# Patient Record
Sex: FEMALE
[De-identification: names, ages, dates, MRNs, and addresses within clinical notes are randomized per-mention and may not be internally consistent; named-entity substitution may affect disease eponyms.]

---

## 2018-01-17 ENCOUNTER — HOSPITAL ENCOUNTER (EMERGENCY)
Dept: HOSPITAL 14 - H.ER | Age: 58
Discharge: HOME | End: 2018-01-17
Payer: COMMERCIAL

## 2018-01-17 VITALS — OXYGEN SATURATION: 97 % | TEMPERATURE: 98 F

## 2018-01-17 VITALS — DIASTOLIC BLOOD PRESSURE: 80 MMHG | HEART RATE: 91 BPM | RESPIRATION RATE: 16 BRPM | SYSTOLIC BLOOD PRESSURE: 122 MMHG

## 2018-01-17 DIAGNOSIS — E11.9: ICD-10-CM

## 2018-01-17 DIAGNOSIS — J11.1: Primary | ICD-10-CM

## 2018-01-17 DIAGNOSIS — J45.909: ICD-10-CM

## 2018-01-17 DIAGNOSIS — Z79.84: ICD-10-CM

## 2018-01-17 LAB
ALBUMIN SERPL-MCNC: 4.7 G/DL (ref 3.5–5)
ALBUMIN/GLOB SERPL: 1.2 {RATIO} (ref 1–2.1)
ALT SERPL-CCNC: 39 U/L (ref 9–52)
AST SERPL-CCNC: 29 U/L (ref 14–36)
BASOPHILS # BLD AUTO: 0.1 K/UL (ref 0–0.2)
BASOPHILS NFR BLD: 0.5 % (ref 0–2)
BILIRUB UR-MCNC: NEGATIVE MG/DL
BUN SERPL-MCNC: 16 MG/DL (ref 7–17)
CALCIUM SERPL-MCNC: 9.8 MG/DL (ref 8.4–10.2)
COLOR UR: YELLOW
EOSINOPHIL # BLD AUTO: 0.1 K/UL (ref 0–0.7)
EOSINOPHIL NFR BLD: 1.2 % (ref 0–4)
ERYTHROCYTE [DISTWIDTH] IN BLOOD BY AUTOMATED COUNT: 13.6 % (ref 11.5–14.5)
GFR NON-AFRICAN AMERICAN: > 60
GLUCOSE UR STRIP-MCNC: (no result) MG/DL
HGB BLD-MCNC: 13.4 G/DL (ref 12–16)
LEUKOCYTE ESTERASE UR-ACNC: (no result) LEU/UL
LYMPHOCYTES # BLD AUTO: 3.5 K/UL (ref 1–4.3)
LYMPHOCYTES NFR BLD AUTO: 28.5 % (ref 20–40)
MCH RBC QN AUTO: 30.7 PG (ref 27–31)
MCHC RBC AUTO-ENTMCNC: 34 G/DL (ref 33–37)
MCV RBC AUTO: 90.4 FL (ref 81–99)
MONOCYTES # BLD: 1.3 K/UL (ref 0–0.8)
MONOCYTES NFR BLD: 10.8 % (ref 0–10)
NEUTROPHILS # BLD: 7.3 K/UL (ref 1.8–7)
NEUTROPHILS NFR BLD AUTO: 59 % (ref 50–75)
NRBC BLD AUTO-RTO: 0.1 % (ref 0–0)
PH UR STRIP: 5 [PH] (ref 5–8)
PLATELET # BLD: 306 K/UL (ref 130–400)
PMV BLD AUTO: 8.7 FL (ref 7.2–11.7)
PROT UR STRIP-MCNC: 30 MG/DL
RBC # BLD AUTO: 4.36 MIL/UL (ref 3.8–5.2)
RBC # UR STRIP: NEGATIVE /UL
SP GR UR STRIP: 1.02 (ref 1–1.03)
SQUAMOUS EPITHIAL: 1 /HPF (ref 0–5)
URINE CLARITY: (no result)
URINE HYALINE CAST: (no result) /HPF (ref 0–2)
URINE NITRATE: NEGATIVE
UROBILINOGEN UR-MCNC: (no result) MG/DL (ref 0.2–1)
WBC # BLD AUTO: 12.3 K/UL (ref 4.8–10.8)

## 2018-01-17 PROCEDURE — 96374 THER/PROPH/DIAG INJ IV PUSH: CPT

## 2018-01-17 PROCEDURE — 94640 AIRWAY INHALATION TREATMENT: CPT

## 2018-01-17 PROCEDURE — 85025 COMPLETE CBC W/AUTO DIFF WBC: CPT

## 2018-01-17 PROCEDURE — 81003 URINALYSIS AUTO W/O SCOPE: CPT

## 2018-01-17 PROCEDURE — 87804 INFLUENZA ASSAY W/OPTIC: CPT

## 2018-01-17 PROCEDURE — 99285 EMERGENCY DEPT VISIT HI MDM: CPT

## 2018-01-17 PROCEDURE — 71046 X-RAY EXAM CHEST 2 VIEWS: CPT

## 2018-01-17 PROCEDURE — 93005 ELECTROCARDIOGRAM TRACING: CPT

## 2018-01-17 PROCEDURE — 80053 COMPREHEN METABOLIC PANEL: CPT

## 2018-01-17 PROCEDURE — 96361 HYDRATE IV INFUSION ADD-ON: CPT

## 2018-01-17 PROCEDURE — 82550 ASSAY OF CK (CPK): CPT

## 2018-01-17 NOTE — RAD
HISTORY:

cough, hx HIV  



COMPARISON:

2/29/2016



TECHNIQUE:

Chest PA and lateral



FINDINGS:



LUNGS:

No active pulmonary disease.



PLEURA:

No significant pleural effusion identified. No pneumothorax apparent.



CARDIOVASCULAR:

Normal.



OSSEOUS STRUCTURES:

No significant abnormalities.



VISUALIZED UPPER ABDOMEN:

Cholecystectomy clips right upper



OTHER FINDINGS:

None.



IMPRESSION:

No active disease. No infiltrate

## 2018-01-17 NOTE — ED PDOC
HPI: General Adult


Time Seen by Provider: 01/17/18 09:08


Chief Complaint (Nursing): Flu-like Symptoms


Chief Complaint (Provider): flu like symptoms


History Per: Patient


History/Exam Limitations: no limitations


Current Symptoms Are (Timing): Still Present


Severity: Moderate


Additional Complaint(s): 





58yo female c/o flu like symptoms including chills, cough, fatigue, headache, 

sore throat and nausea. Denies abdominal pain, syncope or hemoptysis.


Hx HIV, states VL negative and takes HAART. 





Past Medical History


Reviewed: Historical Data, Nursing Documentation


Vital Signs: 


 Last Vital Signs











Temp  98.0 F   01/17/18 08:09


 


Pulse  91 H  01/17/18 14:18


 


Resp  16   01/17/18 14:18


 


BP  122/80   01/17/18 14:18


 


Pulse Ox  97   01/17/18 10:46














- Medical History


PMH: Asthma, Diabetes, HIV


   Denies: Chronic Kidney Disease





- Family History


Family History: States: Unknown Family Hx





- Living Arrangements


Living Arrangements: With Family





- Social History


Current smoker - smoking cessation education provided: No





- Home Medications


Home Medications: 


 Ambulatory Orders











 Medication  Instructions  Recorded


 


Albuterol HFA [Ventolin HFA 90 2 puff IH Q6H PRN #0 inhaler 03/01/16





mcg/actuation (8 g)]  


 


Alprazolam 1 mg PO HS #0 tablet 03/01/16


 


Ciprofloxacin [Cipro] 500 mg PO BID 7 Days  tab 03/01/16


 


Esomeprazole Magnesium [Nexium] 40 mg PO DAILY #0 capsule. 03/01/16


 


Fluticasone/Salmeterol 100/50 1 puff IH Q12H #0 puff 03/01/16





[Advair Diskus 100/50]  


 


Furosemide [Lasix] 20 mg PO DAILY #7 tab 03/01/16


 


Gabapentin [Neurontin] 600 mg PO Q8H #0 tab 03/01/16


 


Gemfibrozil [Lopid] 600 mg PO BID #0 tablet 03/01/16


 


Lopinavir/Ritonavir [Kaletra 2 tab PO BID #0 tablet 03/01/16





200-50 mg Tablet]  


 


Metformin ER [Glucophage XR] 750 mg PO QPM #0 ter 03/01/16


 


Multivit,Iron,Min 5/Folic Acid 1 tab PO DAILY #0 tablet 03/01/16





[Strovite Forte Caplet]  


 


Tenofovir [Viread] 300 mg PO DAILY #0 tab 03/01/16


 


metroNIDAZOLE [Flagyl] 500 mg PO TID #7 tab 03/01/16


 


Azithromycin [Zithromax] 250 mg PO DAILY #6 tab 01/17/18


 


Ibuprofen [Motrin Tab] 600 mg PO Q6 PRN #15 tab 01/17/18


 


Oseltamivir [Tamiflu] 75 mg PO BID #10 cap 01/17/18














- Allergies


Allergies/Adverse Reactions: 


 Allergies











Allergy/AdvReac Type Severity Reaction Status Date / Time


 


No Known Allergies Allergy   Verified 08/19/15 17:29














Review of Systems


Constitutional: Positive for: Fever, Chills, Weakness, Malaise


Eyes: Negative for: Eyelid Inflammation


ENT: Positive for: Throat Pain.  Negative for: Ear Discharge, Throat Swelling


Cardiovascular: Negative for: Chest Pain, Palpitations


Respiratory: Positive for: Cough.  Negative for: Shortness of Breath


Gastrointestinal: Negative for: Nausea, Vomiting, Abdominal Pain


Genitourinary Female: Negative for: Dysuria, Frequency


Musculoskeletal: Negative for: Neck Pain


Skin: Negative for: Rash, Lesions, Jaundice


Neurological: Negative for: Weakness, Numbness


Psych: Negative for: Anxiety





Physical Exam





- Reviewed


Nursing Documentation Reviewed: Yes


Vital Signs Reviewed: Yes





- Physical Exam


Appears: Positive for: Well


Head Exam: Positive for: ATRAUMATIC, NORMAL INSPECTION, NORMOCEPHALIC


Skin: Positive for: Normal Color, Warm, DRY


Eye Exam: Positive for: EOMI, Normal appearance, PERRL


ENT: Positive for: Tonsillar Exudate, Other (neg oral thrush)


Neck: Positive for: Normal, Painless ROM


Cardiovascular/Chest: Positive for: Regular Rate, Rhythm


Respiratory: Positive for: CNT, Normal Breath Sounds


Gastrointestinal/Abdominal: Positive for: Bowel Sounds, Soft.  Negative for: 

Tenderness


Back: Positive for: Normal Inspection


Extremity: Positive for: Normal ROM


Neurologic/Psych: Positive for: Alert, Oriented.  Negative for: Motor/Sensory 

Deficits





- Laboratory Results


Result Diagrams: 


 01/17/18 09:42





 01/17/18 09:42





- ECG


O2 Sat by Pulse Oximetry: 97





Medical Decision Making


Medical Decision Making: 





labs reviewed


flu neg


mild elev WBC


chem clinicallly unremarkable





improved in ED, no SOB/ chest pain or dizziness


vitals stable


case d/w PMD Dr Forouzesh





Disposition





- Clinical Impression


Clinical Impression: 


 Influenza-like symptoms








- Patient ED Disposition


Is Patient to be Admitted: No


Counseled Patient/Family Regarding: Studies Performed, Diagnosis, Need For 

Followup, Rx Given





- Disposition


Referrals: 


Leti Houston MD [Family Provider] - 


Disposition: Routine/Home


Disposition Time: 14:00


Condition: STABLE


Additional Instructions: 


Drink plenty of fluids. Take medications as directed including daily Advair.


See your doctor on friday for re-evaluation. Return to ER for any difficulty 

breathing, fever or any concern.





Prescriptions: 


Azithromycin [Zithromax] 250 mg PO DAILY #6 tab


Ibuprofen [Motrin Tab] 600 mg PO Q6 PRN #15 tab


 PRN Reason: Pain, Moderate (4-7)


Oseltamivir [Tamiflu] 75 mg PO BID #10 cap


Instructions:  Acute Bronchitis (ED), Viral Syndrome (ED)


Forms:  [employer] [facility] (English), [facility] ED School/Work Excuse

## 2018-01-17 NOTE — CARD
--------------- APPROVED REPORT --------------





EKG Measurement

Heart [id number]

[place] [id number]

[id number]

[id number]

[id number]



<Conclusion>

Normal sinus rhythm

Nonspecific T wave abnormality

Abnormal ECG

## 2018-04-09 ENCOUNTER — HOSPITAL ENCOUNTER (EMERGENCY)
Dept: HOSPITAL 14 - H.ER | Age: 58
LOS: 1 days | Discharge: HOME | End: 2018-04-10
Payer: COMMERCIAL

## 2018-04-09 VITALS
SYSTOLIC BLOOD PRESSURE: 159 MMHG | RESPIRATION RATE: 16 BRPM | HEART RATE: 89 BPM | TEMPERATURE: 98.1 F | OXYGEN SATURATION: 100 % | DIASTOLIC BLOOD PRESSURE: 89 MMHG

## 2018-04-09 DIAGNOSIS — J40: Primary | ICD-10-CM

## 2018-04-09 DIAGNOSIS — E11.9: ICD-10-CM

## 2018-04-09 DIAGNOSIS — Z90.49: ICD-10-CM

## 2018-04-09 DIAGNOSIS — K44.9: ICD-10-CM

## 2018-04-09 DIAGNOSIS — Z79.84: ICD-10-CM

## 2018-04-09 LAB
ALBUMIN SERPL-MCNC: 4.3 G/DL (ref 3.5–5)
ALBUMIN/GLOB SERPL: 1.1 {RATIO} (ref 1–2.1)
ALT SERPL-CCNC: 49 U/L (ref 9–52)
AST SERPL-CCNC: 26 U/L (ref 14–36)
BACTERIA #/AREA URNS HPF: (no result) /[HPF]
BASOPHILS # BLD AUTO: 0.1 K/UL (ref 0–0.2)
BASOPHILS NFR BLD: 0.4 % (ref 0–2)
BILIRUB UR-MCNC: NEGATIVE MG/DL
BNP SERPL-MCNC: 83.1 PG/ML (ref 0–900)
BUN SERPL-MCNC: 19 MG/DL (ref 7–17)
CALCIUM SERPL-MCNC: 9.4 MG/DL (ref 8.4–10.2)
COLOR UR: YELLOW
EOSINOPHIL # BLD AUTO: 0 K/UL (ref 0–0.7)
EOSINOPHIL NFR BLD: 0 % (ref 0–4)
ERYTHROCYTE [DISTWIDTH] IN BLOOD BY AUTOMATED COUNT: 13.8 % (ref 11.5–14.5)
GFR NON-AFRICAN AMERICAN: > 60
GLUCOSE UR STRIP-MCNC: (no result) MG/DL
HGB BLD-MCNC: 14.1 G/DL (ref 12–16)
LEUKOCYTE ESTERASE UR-ACNC: (no result) LEU/UL
LYMPHOCYTES # BLD AUTO: 2.6 K/UL (ref 1–4.3)
LYMPHOCYTES NFR BLD AUTO: 19.8 % (ref 20–40)
MCH RBC QN AUTO: 31 PG (ref 27–31)
MCHC RBC AUTO-ENTMCNC: 33.5 G/DL (ref 33–37)
MCV RBC AUTO: 92.6 FL (ref 81–99)
MONOCYTES # BLD: 1.4 K/UL (ref 0–0.8)
MONOCYTES NFR BLD: 10.8 % (ref 0–10)
NEUTROPHILS # BLD: 9 K/UL (ref 1.8–7)
NEUTROPHILS NFR BLD AUTO: 69 % (ref 50–75)
NRBC BLD AUTO-RTO: 0 % (ref 0–0)
PH UR STRIP: 6 [PH] (ref 5–8)
PLATELET # BLD: 341 K/UL (ref 130–400)
PMV BLD AUTO: 7.9 FL (ref 7.2–11.7)
PROT UR STRIP-MCNC: 100 MG/DL
RBC # BLD AUTO: 4.55 MIL/UL (ref 3.8–5.2)
RBC # UR STRIP: NEGATIVE /UL
SP GR UR STRIP: 1.02 (ref 1–1.03)
URINE CLARITY: (no result)
URINE HYALINE CAST: (no result) /HPF (ref 0–2)
UROBILINOGEN UR-MCNC: (no result) MG/DL (ref 0.2–1)
WBC # BLD AUTO: 13 K/UL (ref 4.8–10.8)

## 2018-04-09 PROCEDURE — 99282 EMERGENCY DEPT VISIT SF MDM: CPT

## 2018-04-09 PROCEDURE — 71260 CT THORAX DX C+: CPT

## 2018-04-09 PROCEDURE — 81003 URINALYSIS AUTO W/O SCOPE: CPT

## 2018-04-09 PROCEDURE — 83880 ASSAY OF NATRIURETIC PEPTIDE: CPT

## 2018-04-09 PROCEDURE — 85025 COMPLETE CBC W/AUTO DIFF WBC: CPT

## 2018-04-09 PROCEDURE — 80053 COMPREHEN METABOLIC PANEL: CPT

## 2018-04-09 NOTE — ED PDOC
HPI: Influenza


Time Seen by Provider: 04/09/18 20:23


Chief Complaint: Cough, Cold, Congestion


Chief Complaint (Provider): Wheezing and cough


History Per: Patient


Exam Limitations: no limitations


Have you had recent travel within the past 21 days to any of: No


Onset/Duration Of Symptoms: Mins, Hrs, Days (5)


Symptoms include: fever, headache, cough, nasal congestion, difficulty breathing


Sick Contacts (Context): None


Hx Influenza Vaccination: Yes





Past Medical History


Vital Signs: 


 Last Vital Signs











Temp  98.1 F   04/09/18 17:46


 


Pulse  89   04/09/18 17:46


 


Resp  16   04/09/18 17:46


 


BP  159/89 H  04/09/18 17:46


 


Pulse Ox  100   04/09/18 17:46














- Medical History


PMH: Asthma, Diabetes, HIV


   Denies: Chronic Kidney Disease





- Family History


Family History: States: Unknown Family Hx





- Home Medications


Home Medications: 


 Ambulatory Orders











 Medication  Instructions  Recorded


 


Albuterol HFA [Ventolin HFA 90 2 puff IH Q6H PRN #0 inhaler 03/01/16





mcg/actuation (8 g)]  


 


Alprazolam 1 mg PO HS #0 tablet 03/01/16


 


Ciprofloxacin [Cipro] 500 mg PO BID 7 Days  tab 03/01/16


 


Esomeprazole Magnesium [Nexium] 40 mg PO DAILY #0 capsule. 03/01/16


 


Fluticasone/Salmeterol 100/50 1 puff IH Q12H #0 puff 03/01/16





[Advair Diskus 100/50]  


 


Furosemide [Lasix] 20 mg PO DAILY #7 tab 03/01/16


 


Gabapentin [Neurontin] 600 mg PO Q8H #0 tab 03/01/16


 


Gemfibrozil [Lopid] 600 mg PO BID #0 tablet 03/01/16


 


Lopinavir/Ritonavir [Kaletra 2 tab PO BID #0 tablet 03/01/16





200-50 mg Tablet]  


 


Metformin ER [Glucophage XR] 750 mg PO QPM #0 ter 03/01/16


 


Multivit,Iron,Min 5/Folic Acid 1 tab PO DAILY #0 tablet 03/01/16





[Strovite Forte Caplet]  


 


Tenofovir [Viread] 300 mg PO DAILY #0 tab 03/01/16


 


metroNIDAZOLE [Flagyl] 500 mg PO TID #7 tab 03/01/16


 


Azithromycin [Zithromax] 250 mg PO DAILY #6 tab 01/17/18


 


Ibuprofen [Motrin Tab] 600 mg PO Q6 PRN #15 tab 01/17/18


 


Oseltamivir [Tamiflu] 75 mg PO BID #10 cap 01/17/18


 


levoFLOXacin [Levaquin] 750 mg PO DAILY #7 tab 04/09/18














- Allergies


Allergies/Adverse Reactions: 


 Allergies











Allergy/AdvReac Type Severity Reaction Status Date / Time


 


No Known Allergies Allergy   Verified 08/19/15 17:29














Review of Systems


Respiratory: Positive for: Cough, Shortness of Breath, Wheezing





Physical Exam





- Reviewed


Nursing Documentation Reviewed: Yes


Vital Signs Reviewed: Yes





- Physical Exam


Appears: Positive for: Well, Non-toxic, No Acute Distress


Head Exam: Positive for: ATRAUMATIC, NORMAL INSPECTION, NORMOCEPHALIC


Skin: Positive for: Normal Color


ENT: Positive for: Normal ENT Inspection, Pharynx Is (clear and nonethematous)


Neck: Positive for: Normal, Painless ROM, Supple


Cardiovascular/Chest: Positive for: Regular Rate, Rhythm, Chest Non Tender.  

Negative for: Edema, Gallop


Respiratory: Positive for: Decreased Breath Sounds, Crackles, Wheezing.  

Negative for: Accessory Muscle Use, Respiratory Distress


Pulses-Carotid (L): 2+


Pulses-Carotid (R): 2+


Pulses-Radial (L): 2+


Pulses-Radial (R): 2+





Medical Decision Making


Medical Decision Making: 





CT Chest





Blood work





EXAM:


CT Chest With Intravenous Contrast


CLINICAL HISTORY:


57 years old, female; Signs and symptoms; Other: Difficulty breathing; 

Additional info: R/O pna


TECHNIQUE:


CT angiogram of the chest with intravenous contrast. All CT scans at this 

facility use one or more


dose reduction techniques, viz.: automated exposure control; ma/kV adjustment 

per patient size


(including targeted exams where dose is matched to indication; i.e. head); or 

iterative reconstruction


technique.


Coronal and sagittal reformatted images were created and reviewed.


CONTRAST:


90 mL of Omnipaque administered intravenously.


COMPARISON:


CR - CHEST TWO VIEWS (PA/LAT) 2016-02-29 16:29


FINDINGS:


Lungs: There is minimal bibasilar atelectasis or fibrosis. No focal 

consolidation.


Pleural space: Unremarkable. No pneumothorax. No significant effusion.


Heart: Unremarkable. No cardiomegaly. No significant pericardial effusion.


Bones/joints: Unremarkable. No acute fracture. No dislocation.


Soft tissues: Unremarkable.


Vasculature: Unremarkable. No thoracic aortic aneurysm.


Lymph nodes: Nonspecific 1.6 cm precarinal lymph node. There are few 

subcentimeter lymph


nodes in the AP window.


Gallbladder and bile ducts: There has been a cholecystectomy.


IMPRESSION:


No evidence of pulmonary embolism. No focal consolidation.


Cholecystectomy.


Small hiatal hernia.





- Laboratory Results


Result Diagrams: 


 04/09/18 20:56





 04/09/18 20:56





- ECG


O2 Sat by Pulse Oximetry: 100





Disposition





- Clinical Impression


Clinical Impression: 


 Bronchitis, Chest congestion








- Patient ED Disposition


Is Patient to be Admitted: No


Counseled Patient/Family Regarding: Studies Performed, Diagnosis, Need For 

Followup, Rx Given





- Disposition


Disposition: Routine/Home


Disposition Time: 23:51


Condition: GOOD


Prescriptions: 


levoFLOXacin [Levaquin] 750 mg PO DAILY #7 tab


Instructions:  Acute Bronchitis, Adult (DC), Acute Bronchitis


Forms:  [facility] (English)

## 2018-04-09 NOTE — CT
EXAM:

  CT Chest With Intravenous Contrast



CLINICAL HISTORY:

  57 years old, female; Signs and symptoms; Other: Difficulty breathing; 

Additional info: R/O pna



TECHNIQUE:

CT angiogram of the chest with intravenous contrast.  All CT scans at this 

facility use one or more dose reduction techniques, viz.: automated exposure 

control; ma/kV adjustment per patient size (including targeted exams where dose 

is matched to indication; i.e. head); or iterative reconstruction technique.

  Coronal and sagittal reformatted images were created and reviewed.



CONTRAST:

  90 mL of Omnipaque administered intravenously.



COMPARISON:

  CR - CHEST TWO VIEWS (PA/LAT) 2016-02-29 16:29



FINDINGS:

  Lungs:  There is minimal bibasilar atelectasis or fibrosis.  No focal 

consolidation.

  Pleural space:  Unremarkable.  No pneumothorax.  No significant effusion.

  Heart:  Unremarkable.  No cardiomegaly.  No significant pericardial effusion.

  Bones/joints:  Unremarkable.  No acute fracture.  No dislocation.

  Soft tissues:  Unremarkable.

  Vasculature:  Unremarkable.  No thoracic aortic aneurysm.

  Lymph nodes:  Nonspecific 1.6 cm precarinal lymph node.  There are few 

subcentimeter lymph nodes in the AP window.

  Gallbladder and bile ducts:  There has been a cholecystectomy.



IMPRESSION:     

No evidence of pulmonary embolism.  No focal consolidation.



Cholecystectomy.



Small hiatal hernia.

## 2018-05-02 ENCOUNTER — HOSPITAL ENCOUNTER (EMERGENCY)
Dept: HOSPITAL 14 - H.ER | Age: 58
Discharge: HOME | End: 2018-05-02
Payer: COMMERCIAL

## 2018-05-02 VITALS
DIASTOLIC BLOOD PRESSURE: 85 MMHG | TEMPERATURE: 98 F | HEART RATE: 81 BPM | RESPIRATION RATE: 16 BRPM | SYSTOLIC BLOOD PRESSURE: 133 MMHG

## 2018-05-02 DIAGNOSIS — J45.909: ICD-10-CM

## 2018-05-02 DIAGNOSIS — Z79.84: ICD-10-CM

## 2018-05-02 DIAGNOSIS — E78.00: ICD-10-CM

## 2018-05-02 DIAGNOSIS — R42: Primary | ICD-10-CM

## 2018-05-02 DIAGNOSIS — E11.9: ICD-10-CM

## 2018-05-02 LAB
BASOPHILS # BLD AUTO: 0.1 K/UL (ref 0–0.2)
BASOPHILS NFR BLD: 0.8 % (ref 0–2)
BUN SERPL-MCNC: 19 MG/DL (ref 7–17)
CALCIUM SERPL-MCNC: 8.9 MG/DL (ref 8.4–10.2)
EOSINOPHIL # BLD AUTO: 0 K/UL (ref 0–0.7)
EOSINOPHIL NFR BLD: 0.3 % (ref 0–4)
ERYTHROCYTE [DISTWIDTH] IN BLOOD BY AUTOMATED COUNT: 15 % (ref 11.5–14.5)
GFR NON-AFRICAN AMERICAN: > 60
HGB BLD-MCNC: 13 G/DL (ref 12–16)
LYMPHOCYTES # BLD AUTO: 2.2 K/UL (ref 1–4.3)
LYMPHOCYTES NFR BLD AUTO: 25.5 % (ref 20–40)
MCH RBC QN AUTO: 31.9 PG (ref 27–31)
MCHC RBC AUTO-ENTMCNC: 33.5 G/DL (ref 33–37)
MCV RBC AUTO: 95.1 FL (ref 81–99)
MONOCYTES # BLD: 0.7 K/UL (ref 0–0.8)
MONOCYTES NFR BLD: 8 % (ref 0–10)
NEUTROPHILS # BLD: 5.6 K/UL (ref 1.8–7)
NEUTROPHILS NFR BLD AUTO: 65.4 % (ref 50–75)
NRBC BLD AUTO-RTO: 0 % (ref 0–0)
PLATELET # BLD: 241 K/UL (ref 130–400)
PMV BLD AUTO: 7.5 FL (ref 7.2–11.7)
RBC # BLD AUTO: 4.09 MIL/UL (ref 3.8–5.2)
WBC # BLD AUTO: 8.6 K/UL (ref 4.8–10.8)

## 2018-05-02 NOTE — ED PDOC
HPI: General Adult


Time Seen by Provider: 18 06:33


Chief Complaint (Nursing): Dizziness/Lightheaded


Chief Complaint (Provider): Dizziness/Lightheaded


History/Exam Limitations: no limitations


Onset/Duration Of Symptoms: Days (x 1)


Additional Complaint(s): 


57 year old female with history asthma, bronchitis, and HIV, presents to the ED 

with dizziness since awaking this morning. She describes it as spinning but it 

is improving with time. She tried to get up and it was getting worse. Similar 

episode occurred very briefly in the early afternoon yesterday and has not 

happened again. Denies headache, chest pain and shortness of breath. 








PMD: Leti Houston








Past Medical History


Reviewed: Historical Data, Nursing Documentation, Vital Signs


Vital Signs: 


 Last Vital Signs











Temp  98.0 F   18 10:45


 


Pulse  81   18 10:45


 


Resp  16   18 10:45


 


BP  133/85   18 10:45


 


Pulse Ox  100   18 10:45














- Medical History


PMH: Asthma, Bronchitis, Diabetes, HIV, Hypercholesterolemia


   Denies: Chronic Kidney Disease





- Surgical History


Surgical History: No Surg Hx





- Family History


Family History: States: Unknown Family Hx





- Immunization History


Hx Influenza Vaccination: Yes





- Home Medications


Home Medications: 


 Ambulatory Orders











 Medication  Instructions  Recorded


 


Albuterol HFA [Ventolin HFA 90 2 puff IH Q6H PRN #0 inhaler 16





mcg/actuation (8 g)]  


 


Alprazolam 1 mg PO HS #0 tablet 16


 


Ciprofloxacin [Cipro] 500 mg PO BID 7 Days  tab 16


 


Esomeprazole Magnesium [Nexium] 40 mg PO DAILY #0 capsule. 16


 


Fluticasone/Salmeterol 100/50 1 puff IH Q12H #0 puff 16





[Advair Diskus 100/50]  


 


Furosemide [Lasix] 20 mg PO DAILY #7 tab 16


 


Gabapentin [Neurontin] 600 mg PO Q8H #0 tab 16


 


Gemfibrozil [Lopid] 600 mg PO BID #0 tablet 16


 


Lopinavir/Ritonavir [Kaletra 2 tab PO BID #0 tablet 16





200-50 mg Tablet]  


 


Metformin ER [Glucophage XR] 750 mg PO QPM #0 ter 16


 


Multivit,Iron,Min 5/Folic Acid 1 tab PO DAILY #0 tablet 16





[Strovite Forte Caplet]  


 


Tenofovir [Viread] 300 mg PO DAILY #0 tab 16


 


metroNIDAZOLE [Flagyl] 500 mg PO TID #7 tab 16


 


Azithromycin [Zithromax] 250 mg PO DAILY #6 tab 18


 


Ibuprofen [Motrin Tab] 600 mg PO Q6 PRN #15 tab 18


 


Oseltamivir [Tamiflu] 75 mg PO BID #10 cap 18


 


levoFLOXacin [Levaquin] 750 mg PO DAILY #7 tab 18


 


Meclizine [Meclizine*] 25 mg PO Q12 PRN #10 tab 18














- Allergies


Allergies/Adverse Reactions: 


 Allergies











Allergy/AdvReac Type Severity Reaction Status Date / Time


 


No Known Allergies Allergy   Verified 08/19/15 17:29














Review of Systems


ROS Statement: Except As Marked, All Systems Reviewed And Found Negative


Neurological: Positive for: Dizziness





Physical Exam





- Reviewed


Nursing Documentation Reviewed: Yes


Vital Signs Reviewed: Yes





- Physical Exam


Appears: Positive for: Non-toxic, No Acute Distress


Head Exam: Positive for: ATRAUMATIC, NORMOCEPHALIC


Skin: Positive for: Normal Color, Warm, DRY


Eye Exam: Positive for: Normal appearance, EOMI, PERRL.  Negative for: Nystagmus


ENT: Positive for: Normal ENT Inspection


Neck: Positive for: Normal, Painless ROM


Cardiovascular/Chest: Positive for: Regular Rate, Rhythm.  Negative for: Murmur


Respiratory: Positive for: Normal Breath Sounds.  Negative for: Respiratory 

Distress


Gastrointestinal/Abdominal: Positive for: Normal Exam, Soft


Back: Positive for: Normal Inspection.  Negative for: L CVA Tenderness, R CVA 

Tenderness


Extremity: Positive for: Normal ROM.  Negative for: Deformity


Neurologic/Psych: Positive for: Alert, CNs II-XII (intact ), Oriented, Gait (

steady).  Negative for: Motor/Sensory Deficits





- Laboratory Results


Result Diagrams: 


 18 06:56





 18 06:56





- ECG


O2 Sat by Pulse Oximetry: 98 (Ra)


Pulse Ox Interpretation: Normal





Medical Decision Making


Medical Decision Makin;33


Impression: vertigo 


Differential diagnsoes :peripheral vertigo and central vertigo 


Initial Plan: 


--head Ct 


--EKG 


--BMP


--Troponin I 


--CBC


--Antivert 25 mg PO


--Duoneb 3 ml INH 


--peak flow pre/post  








--------------------------------------------------------------------------------

-----------------


Scribe Attestation:


Documented by Lata Castellanos, acting as a scribe for Jose L Tee MD





Provider Scribe Attestation:


All medical record entries made by the Scribe were at my direction and 

personally dictated by me. I have reviewed the chart and agree that the record 

accurately reflects my personal performance of the history, physical exam, 

medical decision making, and the department course for this patient. I have 

also personally directed, reviewed, and agree with the discharge instructions 

and disposition.





Disposition





- Clinical Impression


Clinical Impression: 


 Dizziness








- Patient ED Disposition


Is Patient to be Admitted: Transfer of Care


Counseled Patient/Family Regarding: Studies Performed, Diagnosis





- Disposition


Referrals: 


Leti Houston MD [Family Provider] - 18


Disposition: Transfer of Care


Disposition Time: 07:00


Condition: STABLE


Additional Instructions: 


Return if not better in 3 days. 


Prescriptions: 


Meclizine [Meclizine*] 25 mg PO Q12 PRN #10 tab


 PRN Reason: Dizziness


Instructions:  Vertigo (a Type of Dizziness)


Forms:  Identified (English), [facility] ED School/Work Excuse


Patient Signed Over To: Fabiano Gaytan

## 2018-05-02 NOTE — CARD
--------------- APPROVED REPORT --------------





EKG Measurement

Heart [id number]

[place] [id number]

[id number]

[id number]

[id number]



<Conclusion>

Normal sinus rhythm

Normal ECG

## 2018-05-02 NOTE — CT
PROCEDURE:  CT HEAD WITHOUT CONTRAST.



HISTORY:

vertigo



COMPARISON:

Unenhanced head CT 03/19/2012. 



TECHNIQUE:

Axial computed tomography images were obtained through the head/brain 

without intravenous contrast.  



Radiation dose:



Total exam DLP = 720.93 mGy-cm.



This CT exam was performed using one or more of the following dose 

reduction techniques: Automated exposure control, adjustment of the 

mA and/or kV according to patient size, and/or use of iterative 

reconstruction technique.



FINDINGS:



HEMORRHAGE:

No intracranial hemorrhage. 



BRAIN:

Stable examination. Normal gray-white matter differentiation and 

density are again appreciated throughout the cerebrum and cerebellum 

with the brainstem appearing unremarkable as well.  There is no mass 

effect.  There is no suspicious extra-axial fluid collection and the 

midline brain anatomy appears diffusely unremarkable. 



VENTRICLES:

Unremarkable. No hydrocephalus. 



CALVARIUM:

Unremarkable.



PARANASAL SINUSES:

Unremarkable as visualized. No significant inflammatory changes.



MASTOID AIR CELLS:

Unremarkable as visualized. No inflammatory changes.



OTHER FINDINGS:

None.



IMPRESSION:

Stable, unremarkable unenhanced CT of the Head.  Follow-up CT or MRI 

are available as clinically warranted.

## 2018-05-02 NOTE — ED PDOC
- Laboratory Results


Result Diagrams: 


 18 06:56





 18 06:56


Interpretation Of Abn Labs: no acute





- ECG


ECG: Positive for: Interpreted By Me, Viewed By Me


ECG Rhythm: Positive for: Normal QRS, Normal ST Segment, Sinus Rhythm


O2 Sat by Pulse Oximetry: 98 (Ra)


Pulse Ox Interpretation: Normal





- CT Scan/US


  ** ct


Other Rad Studies (CT/US): Read By Radiologist


Other Rad Interpretation: no acute





- Progress


ED Course And Treament: 





1027:  Spoke with Dr. SWIFT  Made aware of all findings and presentation.  Wants 

pt. to be dc and see her Friday.  AAOx3.  Stable.  No symptoms and ambulating.  

Fu.  





Medical Decision Making


Medical Decision Makin:00


Took over care from Dr. Tee. Pt is 56 y/o here for evaluation of 

dizziness. Pt pending labs, ekg, and blood work. PCP is Dr. Houston.





________________________________________________________________________________

_____________________________________________________


Scribe Attestation:   


Documented by Lul Miguel, acting as a scribe for Fabiano Gaytan MD.





Provider Scribe Attestation:


All medical record entries made by the Scribe were at my direction and 

personally dictated by me. I have reviewed the chart and agree that the record 

accurately reflects my personal performance of the history, physical exam, 

medical decision making, and the department course for this patient. I have 

also personally directed, reviewed, and agree with the discharge instructions 

and disposition. 











Disposition


Counseled Patient/Family Regarding: Studies Performed, Diagnosis, Need For 

Followup, Rx Given





- Clinical Impression


Clinical Impression: 


 Dizziness








- POA


Present On Arrival: None





- Disposition


Referrals: 


Leti Houston MD [Family Provider] - 18


Disposition: Routine/Home


Disposition Time: 10:29


Condition: STABLE


Additional Instructions: 


Return if not better in 3 days. 


Prescriptions: 


Meclizine [Meclizine*] 25 mg PO Q12 PRN #10 tab


 PRN Reason: Dizziness


Instructions:  Vertigo (a Type of Dizziness)


Forms:  [email or web address] [facility] (English), [facility] ED School/Work Excuse

## 2018-05-03 VITALS — OXYGEN SATURATION: 98 %

## 2018-10-04 ENCOUNTER — HOSPITAL ENCOUNTER (EMERGENCY)
Dept: HOSPITAL 14 - H.ER | Age: 58
Discharge: HOME | End: 2018-10-04
Payer: COMMERCIAL

## 2018-10-04 VITALS — HEART RATE: 82 BPM | DIASTOLIC BLOOD PRESSURE: 82 MMHG | RESPIRATION RATE: 18 BRPM | SYSTOLIC BLOOD PRESSURE: 130 MMHG

## 2018-10-04 VITALS — TEMPERATURE: 98.3 F | OXYGEN SATURATION: 97 %

## 2018-10-04 DIAGNOSIS — Z79.84: ICD-10-CM

## 2018-10-04 DIAGNOSIS — N20.0: Primary | ICD-10-CM

## 2018-10-04 DIAGNOSIS — E11.9: ICD-10-CM

## 2018-10-04 DIAGNOSIS — E78.00: ICD-10-CM

## 2018-10-04 LAB
ALBUMIN SERPL-MCNC: 3.8 G/DL (ref 3.5–5)
ALBUMIN/GLOB SERPL: 1 {RATIO} (ref 1–2.1)
ALT SERPL-CCNC: 30 U/L (ref 9–52)
AST SERPL-CCNC: 23 U/L (ref 14–36)
BACTERIA #/AREA URNS HPF: (no result) /[HPF]
BASOPHILS # BLD AUTO: 0.1 K/UL (ref 0–0.2)
BASOPHILS NFR BLD: 1.1 % (ref 0–2)
BILIRUB UR-MCNC: NEGATIVE MG/DL
BUN SERPL-MCNC: 13 MG/DL (ref 7–17)
CALCIUM SERPL-MCNC: 9.1 MG/DL (ref 8.4–10.2)
COLOR UR: (no result)
EOSINOPHIL # BLD AUTO: 0.1 K/UL (ref 0–0.7)
EOSINOPHIL NFR BLD: 1 % (ref 0–4)
ERYTHROCYTE [DISTWIDTH] IN BLOOD BY AUTOMATED COUNT: 13.7 % (ref 11.5–14.5)
GFR NON-AFRICAN AMERICAN: > 60
GLUCOSE UR STRIP-MCNC: (no result) MG/DL
HGB BLD-MCNC: 13 G/DL (ref 12–16)
LEUKOCYTE ESTERASE UR-ACNC: (no result) LEU/UL
LYMPHOCYTES # BLD AUTO: 2.6 K/UL (ref 1–4.3)
LYMPHOCYTES NFR BLD AUTO: 43.1 % (ref 20–40)
MCH RBC QN AUTO: 29.4 PG (ref 27–31)
MCHC RBC AUTO-ENTMCNC: 33.3 G/DL (ref 33–37)
MCV RBC AUTO: 88.2 FL (ref 81–99)
MONOCYTES # BLD: 0.8 K/UL (ref 0–0.8)
MONOCYTES NFR BLD: 12.6 % (ref 0–10)
NEUTROPHILS # BLD: 2.6 K/UL (ref 1.8–7)
NEUTROPHILS NFR BLD AUTO: 42.2 % (ref 50–75)
NRBC BLD AUTO-RTO: 0.1 % (ref 0–0)
PH UR STRIP: 6 [PH] (ref 5–8)
PLATELET # BLD: 288 K/UL (ref 130–400)
PMV BLD AUTO: 9.3 FL (ref 7.2–11.7)
PROT UR STRIP-MCNC: 100 MG/DL
RBC # BLD AUTO: 4.44 MIL/UL (ref 3.8–5.2)
RBC # UR STRIP: (no result) /UL
SP GR UR STRIP: 1.01 (ref 1–1.03)
SQUAMOUS EPITHIAL: 1 /HPF (ref 0–5)
URINE CLARITY: (no result)
URINE HYALINE CAST: (no result) /HPF (ref 0–2)
UROBILINOGEN UR-MCNC: (no result) MG/DL (ref 0.2–1)
WBC # BLD AUTO: 6.1 K/UL (ref 4.8–10.8)

## 2018-10-04 PROCEDURE — 85025 COMPLETE CBC W/AUTO DIFF WBC: CPT

## 2018-10-04 PROCEDURE — 96374 THER/PROPH/DIAG INJ IV PUSH: CPT

## 2018-10-04 PROCEDURE — 93005 ELECTROCARDIOGRAM TRACING: CPT

## 2018-10-04 PROCEDURE — 74176 CT ABD & PELVIS W/O CONTRAST: CPT

## 2018-10-04 PROCEDURE — 80053 COMPREHEN METABOLIC PANEL: CPT

## 2018-10-04 PROCEDURE — 99283 EMERGENCY DEPT VISIT LOW MDM: CPT

## 2018-10-04 PROCEDURE — 81003 URINALYSIS AUTO W/O SCOPE: CPT

## 2018-10-04 PROCEDURE — 96361 HYDRATE IV INFUSION ADD-ON: CPT

## 2018-10-04 NOTE — ED PDOC
- Laboratory Results


Result Diagrams: 


                                 10/04/18 06:35





                                 10/04/18 06:35





- ECG


O2 Sat by Pulse Oximetry: 97 (RA)


Pulse Ox Interpretation: Normal





Medical Decision Making


Medical Decision Making: 


Patient signed out to me by Dr. Hitchcock pending labs and urine results.





0811


CT Report reviewed, patient with obstructing stone in left ureterovesicle 

junction.


Patient to be discharged home with antibiotics and Flomax. Informed to follow up

with Dr. Houston and Dr. Sellers.








Scribe Attestation:


Documented by Barbra Ruiz, acting as a scribe for Beka Scott MD.





Provider Scribe Attestation:


All medical record entries made by the Scribe were at my direction and 

personally dictated by me. I have reviewed the chart and agree that the record 

accurately reflects my personal performance of the history, physical exam, 

medical decision making, and the department course for this patient. I have also

personally directed, reviewed, and agree with the discharge instructions and 

disposition.








Disposition





- Clinical Impression


Clinical Impression: 


 Kidney stone








- POA


Present On Arrival: None





- Disposition


Referrals: 


Leti Houston MD [Staff Provider] - 


Lincoln Quintana MD [Staff Provider] - 


Disposition: Routine/Home


Disposition Time: 08:04


Condition: FAIR


Prescriptions: 


Ciprofloxacin HCl [Cipro] 500 mg PO BID #20 tab


Tamsulosin [Flomax] 0.4 mg PO DAILY #5 cap


traMADol [Ultram] 50 mg PO Q8 #10 tab


Instructions:  Kidney Stones in Adults


Forms:  [facility] [facility] (English)

## 2018-10-04 NOTE — CARD
--------------- APPROVED REPORT --------------





Date of service: 10/04/2018



EKG Measurement

Heart [id number]

[place] 166P27

[id number]

[id number]

[id number]



<Conclusion>

Normal sinus rhythm

Normal ECG

## 2018-10-04 NOTE — ED PDOC
HPI: Female  Pain


Time Seen by Provider: 10/04/18 06:11


Chief Complaint (Nursing): Female Genitourinary


Chief Complaint (Provider): hematuria


History Per: Patient


History/Exam Limitations: no limitations


Onset/Duration Of Symptoms: Hrs (x24)


Current Symptoms Are (Timing): Intermittent Episodes


Associated Symptoms: denies: Nausea, Vomiting, Back Pain


Additional Complaint(s): 





Crista Kraus is a 58 year old female, with a past medical history of kidney 

stones and HIV, who presents to the emergency department complaining of 

hematuria onset for x24 hrs. Patient states she developed an episode with 

hematuria yesterday and lower abdominal pain that resolved. Today it recurred, 

she had x2 or 3 episodes of blood in urine and LLQ pain. She denies any nausea, 

vomiting, back pain or other medical complaints.





PMD: Leti Houston





Past Medical History


Reviewed: Historical Data, Nursing Documentation, Vital Signs


Vital Signs: 





                                Last Vital Signs











Temp  98.3 F   10/04/18 06:02


 


Pulse  80   10/04/18 06:02


 


Resp  16   10/04/18 06:02


 


BP  158/86 H  10/04/18 06:02


 


Pulse Ox  97   10/04/18 06:02














- Medical History


PMH: Asthma, Bronchitis, Diabetes, HIV, Hypercholesterolemia, Kidney Stones


   Denies: Chronic Kidney Disease





- Surgical History


Surgical History: Cholecystectomy


Other surgeries: Ectopic pregnancy





- Family History


Family History: States: Unknown Family Hx





- Immunization History


Hx Influenza Vaccination: Yes





- Home Medications


Home Medications: 


                                Ambulatory Orders











 Medication  Instructions  Recorded


 


Ciprofloxacin [Cipro] 500 mg PO BID 7 Days  tab 03/01/16


 


RX: Albuterol HFA [Ventolin HFA 90 2 puff IH Q6H PRN #0 inhaler 03/01/16





mcg/actuation (8 g)]  


 


RX: Alprazolam 1 mg PO HS #0 tablet 03/01/16


 


RX: Esomeprazole Magnesium [Nexium] 40 mg PO DAILY #0 capsule. 03/01/16


 


RX: Fluticasone/Salmeterol 100/50 1 puff IH Q12H #0 puff 03/01/16





[Advair Diskus 100/50]  


 


RX: Furosemide [Lasix] 20 mg PO DAILY #7 tab 03/01/16


 


RX: Gabapentin [Neurontin] 600 mg PO Q8H #0 tab 03/01/16


 


RX: Gemfibrozil [Lopid] 600 mg PO BID #0 tablet 03/01/16


 


RX: Lopinavir/Ritonavir [Kaletra 2 tab PO BID #0 tablet 03/01/16





200-50 mg Tablet]  


 


RX: MetFORMIN ER [Glucophage XR] 750 mg PO QPM #0 ter 03/01/16


 


RX: Multivit,Iron,Min 5/Folic Acid 1 tab PO DAILY #0 tablet 03/01/16





[Strovite Forte Caplet]  


 


RX: Tenofovir [Viread] 300 mg PO DAILY #0 tab 03/01/16


 


metroNIDAZOLE [Flagyl] 500 mg PO TID #7 tab 03/01/16


 


Oseltamivir [Tamiflu] 75 mg PO BID #10 cap 01/17/18


 


RX: Azithromycin [Zithromax] 250 mg PO DAILY #6 tab 01/17/18


 


RX: Ibuprofen [Motrin Tab] 600 mg PO Q6 PRN #15 tab 01/17/18


 


levoFLOXacin [Levaquin] 750 mg PO DAILY #7 tab 04/09/18


 


RX: Meclizine [Meclizine*] 25 mg PO Q12 PRN #10 tab 05/02/18


 


Ciprofloxacin HCl [Cipro] 500 mg PO BID #20 tab 10/04/18


 


RX: traMADol [Ultram] 50 mg PO Q8 #10 tab 10/04/18


 


Tamsulosin [Flomax] 0.4 mg PO DAILY #5 cap 10/04/18














- Allergies


Allergies/Adverse Reactions: 


                                    Allergies











Allergy/AdvReac Type Severity Reaction Status Date / Time


 


No Known Allergies Allergy   Verified 08/19/15 17:29














Review of Systems


ROS Statement: Except As Marked, All Systems Reviewed And Found Negative


Gastrointestinal: Positive for: Abdominal Pain (LLQ).  Negative for: Nausea, 

Vomiting


Genitourinary Female: Positive for: Hematuria


Musculoskeletal: Negative for: Back Pain





Physical Exam





- Reviewed


Nursing Documentation Reviewed: Yes


Vital Signs Reviewed: Yes





- Physical Exam


Appears: Positive for: No Acute Distress


Head Exam: Positive for: ATRAUMATIC, NORMAL INSPECTION, NORMOCEPHALIC


Skin: Positive for: Normal Color, Warm, Dry


Eye Exam: Positive for: Normal appearance, EOMI, PERRL


Neck: Positive for: Painless ROM


Cardiovascular/Chest: Positive for: Regular Rate, Rhythm.  Negative for: Murmur


Respiratory: Positive for: Normal Breath Sounds.  Negative for: Respiratory 

Distress


Gastrointestinal/Abdominal: Positive for: Tenderness (LLQ)


Back: Positive for: Normal Inspection.  Negative for: L CVA Tenderness, R CVA 

Tenderness, Vertebral Tenderness


Extremity: Positive for: Normal ROM (upper and lower extremities).  Negative 

for: Deformity, Swelling


Neurologic/Psych: Positive for: Alert, Oriented, Gait (steady)





- Laboratory Results


Result Diagrams: 


                                 10/04/18 06:35





                                 10/04/18 06:35





- ECG


O2 Sat by Pulse Oximetry: 97 (RA)


Pulse Ox Interpretation: Normal





Medical Decision Making


Medical Decision Making: 





Time: 06:11


Initial Impression: 57 y/o  female with hematuria and lower abdominal 

pain in setting of kidney stones and HIV.





Initial Plan:





--EKG


--CMP


--Urine pregnancy


--Urine dipstick


--CBC w/ differential


--Toradol 15 mg IV


--Urinalysis 


--Reevaluation








07:00


-Patient signed out to Dr. Scott, pending labs and urine. 





---------------------------------

----------------------------------------------------





Scribe Attestation:


Documented by Blayne Corona, acting as a scribe for Domingo Hitchcock MD.





Provider Scribe Attestation:


All medical record entries made by the Scribe were at my direction and 

personally dictated by me. I have reviewed the chart and agree that the record 

accurately reflects my personal performance of the history, physical exam, 

medical decision making, and the department course for this patient. I have also

 personally directed, reviewed, and agree with the discharge instructions and 

disposition.





Disposition





- Clinical Impression


Clinical Impression: 


 Kidney stone








- Disposition


Referrals: 


Lincoln Quintana MD [Staff Provider] - 


Leti Houston MD [Staff Provider] - 


Disposition: Transfer of Care


Disposition Time: 07:00


Condition: FAIR


Prescriptions: 


Ciprofloxacin HCl [Cipro] 500 mg PO BID #20 tab


Tamsulosin [Flomax] 0.4 mg PO DAILY #5 cap


RX: traMADol [Ultram] 50 mg PO Q8 #10 tab


Instructions:  Kidney Stones in Adults


Forms:  [facility] (English), [facility] ED School/Work Excuse

## 2018-10-04 NOTE — CT
Date of service: 



10/04/2018



PROCEDURE:  CT Abdomen and Pelvis without intravenous contrast



HISTORY:

renal colic



COMPARISON:

Abdomen pelvis CT with contrast 03/01/2016.



TECHNIQUE:

Helical CT of the abdomen and pelvis was performed without oral or 

intravenous contrast as per referring physician request. Coronal and 

sagittal reformats were generated. 



Contrast dose: None



Radiation dose:



Total exam DLP = 490.18 mGy-cm.



This CT exam was performed using one or more of the following dose 

reduction techniques: Automated exposure control, adjustment of the 

mA and/or kV according to patient size, and/or use of iterative 

reconstruction technique.



FINDINGS:



LOWER THORAX:

Limited dependent atelectasis noted at the bilateral bases with a 

small bulla at the right middle lobe inferiorly.  Cardiomegaly is 

mild.  Linear atelectasis in the right base.  No pleural or 

pericardial effusion identified.  Small hiatal hernia. 



LIVER:

Unremarkable. No gross lesion or ductal dilatation.  



GALLBLADDER AND BILE DUCTS:

Prior cholecystectomy. 



PANCREAS:

Unremarkable. No gross lesion or ductal dilatation.



SPLEEN:

Unremarkable. 



ADRENALS:

Unremarkable. No mass. 



KIDNEYS AND URETERS:

Right kidney appears unremarkable.  Borderline left hydronephrosis 

and hydroureter with a spindle shaped 4 mm calculus identified at 

either the left ureter vessel junction or at the left urinary bladder 

base in the lumen.  No significant left perinephric reaction however. 



VASCULATURE:

Unremarkable. No aortic aneurysm. 



BOWEL:

Stomach is collapsed and poorly evaluated as result.  No bowel 

obstruction.  Moderate fecal loading seen throughout the colon.  

Infrequent left colonic diverticular are seen without diverticulitis. 





APPENDIX:

Unremarkable. Normal appendix. 



PERITONEUM:

Unremarkable. No free fluid. No free air. 



LYMPH NODES:

Unremarkable. No enlarged lymph nodes. 



BLADDER:

Calculus at base as discussed above with bladder otherwise 

unremarkable. 



REPRODUCTIVE:

Unremarkable. 



BONES:

No acute fracture.  Grade 1 spondylolisthesis L4-5 with L4 slightly 

anterior to L5 on the basis of advanced facet joint degenerative 

arthropathy rather than spondylolysis. 



OTHER FINDINGS:

None.



IMPRESSION:

Borderline left hydronephrosis and hydroureter on the basis of a 

spindle shaped calculus likely at the left UVJ or possibly in the 

lumen of the urinary bladder dependent portion.  Unremarkable right 

kidney.



Prior cholecystectomy. 



Minimal left colonic diverticulosis without acute changes. 



Discordant preliminary report from [facility] (bowel diverticulosis), 

10/04/2018.